# Patient Record
Sex: MALE | Race: OTHER | Employment: FULL TIME | URBAN - METROPOLITAN AREA
[De-identification: names, ages, dates, MRNs, and addresses within clinical notes are randomized per-mention and may not be internally consistent; named-entity substitution may affect disease eponyms.]

---

## 2020-02-13 ENCOUNTER — OFFICE VISIT (OUTPATIENT)
Dept: FAMILY MEDICINE CLINIC | Facility: CLINIC | Age: 30
End: 2020-02-13
Payer: COMMERCIAL

## 2020-02-13 ENCOUNTER — APPOINTMENT (OUTPATIENT)
Dept: LAB | Age: 30
End: 2020-02-13
Attending: FAMILY MEDICINE
Payer: COMMERCIAL

## 2020-02-13 VITALS
BODY MASS INDEX: 44.38 KG/M2 | SYSTOLIC BLOOD PRESSURE: 138 MMHG | HEIGHT: 70 IN | DIASTOLIC BLOOD PRESSURE: 84 MMHG | HEART RATE: 88 BPM | WEIGHT: 310 LBS

## 2020-02-13 DIAGNOSIS — E66.01 MORBID OBESITY (HCC): Primary | ICD-10-CM

## 2020-02-13 DIAGNOSIS — E66.01 MORBID OBESITY (HCC): ICD-10-CM

## 2020-02-13 LAB
CHOLEST SMN-MCNC: 189 MG/DL (ref ?–200)
GLUCOSE BLD-MCNC: 93 MG/DL (ref 70–99)
HDLC SERPL-MCNC: 37 MG/DL (ref 40–59)
LDLC SERPL CALC-MCNC: 120 MG/DL (ref ?–100)
NONHDLC SERPL-MCNC: 152 MG/DL (ref ?–130)
PATIENT FASTING Y/N/NP: YES
PATIENT FASTING Y/N/NP: YES
TRIGL SERPL-MCNC: 159 MG/DL (ref 30–149)
TSI SER-ACNC: 3.43 MIU/ML (ref 0.36–3.74)
VLDLC SERPL CALC-MCNC: 32 MG/DL (ref 0–30)

## 2020-02-13 PROCEDURE — 99202 OFFICE O/P NEW SF 15 MIN: CPT | Performed by: FAMILY MEDICINE

## 2020-02-13 PROCEDURE — 36415 COLL VENOUS BLD VENIPUNCTURE: CPT

## 2020-02-13 PROCEDURE — 80061 LIPID PANEL: CPT

## 2020-02-13 PROCEDURE — 84443 ASSAY THYROID STIM HORMONE: CPT

## 2020-02-13 PROCEDURE — 82947 ASSAY GLUCOSE BLOOD QUANT: CPT

## 2020-02-13 NOTE — PROGRESS NOTES
Blood pressure 138/84, pulse 88, height 5' 10\" (1.778 m), weight (!) 310 lb (140.6 kg). Patient presents today for initial visit. Has had high blood pressure readings previously. Snores when he sleeps. Concerned about his weight.   Strong family hist

## 2020-03-12 ENCOUNTER — OFFICE VISIT (OUTPATIENT)
Dept: FAMILY MEDICINE CLINIC | Facility: CLINIC | Age: 30
End: 2020-03-12
Payer: COMMERCIAL

## 2020-03-12 VITALS
BODY MASS INDEX: 42.37 KG/M2 | SYSTOLIC BLOOD PRESSURE: 118 MMHG | HEIGHT: 70 IN | HEART RATE: 84 BPM | WEIGHT: 296 LBS | DIASTOLIC BLOOD PRESSURE: 84 MMHG

## 2020-03-12 DIAGNOSIS — E01.0 THYROMEGALY: ICD-10-CM

## 2020-03-12 DIAGNOSIS — Z00.00 ROUTINE PHYSICAL EXAMINATION: Primary | ICD-10-CM

## 2020-03-12 PROCEDURE — 90471 IMMUNIZATION ADMIN: CPT | Performed by: FAMILY MEDICINE

## 2020-03-12 PROCEDURE — 99212 OFFICE O/P EST SF 10 MIN: CPT | Performed by: FAMILY MEDICINE

## 2020-03-12 PROCEDURE — 99395 PREV VISIT EST AGE 18-39: CPT | Performed by: FAMILY MEDICINE

## 2020-03-12 PROCEDURE — 90715 TDAP VACCINE 7 YRS/> IM: CPT | Performed by: FAMILY MEDICINE

## 2020-03-12 NOTE — PROGRESS NOTES
Blood pressure 118/84, pulse 84, height 5' 10\" (1.778 m), weight 296 lb (134.3 kg). REASON FOR VISIT:    Sky Lomeli is a 30year old male who presents for an [address].     Following up for obesity catchers attempt to lose weight has l MEDICAL INFORMATION:   Past Medical History:   Diagnosis Date   • Essential hypertension    • Sleep apnea       History reviewed. No pertinent surgical history.    Family History   Problem Relation Age of Onset   • Heart Disorder Father    • Hypertension cyanosis, clubbing or edema  NEURO: Oriented times three, cranial nerves are intact, motor and sensory are grossly intact      ASSESSMENT AND OTHER RELEVANT CHRONIC CONDITIONS:   Brittny Dela Cruz is a 30year old male who presents for an [address]

## (undated) NOTE — LETTER
08/11/20        Nolan Rizzo  [address]      Dear Jyoti Jaffe,    [address] [facility] records indicate that you have outstanding lab work and or testing that was ordered for you and has not yet been completed:  Orders Placed Th

## (undated) NOTE — LETTER
12/15/20        Torsten Matias  [address]      Dear Ginna Segovia,    [address] [facility] records indicate that you have outstanding lab work and or testing that was ordered for you and has not yet been completed:  Orders Placed Th

## (undated) NOTE — LETTER
04/11/20        Sheng Burger  [address]      Dear Thea Duarte,    [address] [facility] records indicate that you have outstanding lab work and or testing that was ordered for you and has not yet been completed:  Orders Placed Th

## (undated) NOTE — LETTER
10/12/20        Bree Srivastava  [address]      Dear Kira Sharpe,    [address] [facility] records indicate that you have outstanding lab work and or testing that was ordered for you and has not yet been completed:  Orders Placed Th